# Patient Record
Sex: MALE | Race: WHITE | NOT HISPANIC OR LATINO | URBAN - METROPOLITAN AREA
[De-identification: names, ages, dates, MRNs, and addresses within clinical notes are randomized per-mention and may not be internally consistent; named-entity substitution may affect disease eponyms.]

---

## 2023-06-09 ENCOUNTER — TELEPHONE (OUTPATIENT)
Dept: BEHAVIORAL HEALTH | Facility: CLINIC | Age: 28
End: 2023-06-09

## 2023-06-09 NOTE — TELEPHONE ENCOUNTER
Writer received que call from patients mother who stated was transferred. Writer stated could not provider any information as patient is over 19. Writer provided mental health crisis phone numbers for patients mother.    Carol Santos  06/09/2023  [phone]